# Patient Record
Sex: FEMALE | Race: BLACK OR AFRICAN AMERICAN | NOT HISPANIC OR LATINO
[De-identification: names, ages, dates, MRNs, and addresses within clinical notes are randomized per-mention and may not be internally consistent; named-entity substitution may affect disease eponyms.]

---

## 2017-06-17 ENCOUNTER — LAB SERVICES (OUTPATIENT)
Dept: OTHER | Age: 62
End: 2017-06-17

## 2017-06-19 ENCOUNTER — CHARTING TRANS (OUTPATIENT)
Dept: OTHER | Age: 62
End: 2017-06-19

## 2017-06-19 ASSESSMENT — PAIN SCALES - GENERAL: PAINLEVEL_OUTOF10: 0

## 2017-06-20 LAB
APPEARANCE: NORMAL
BILIRUBIN: NORMAL
COLOR: YELLOW
GLUCOSE U: NORMAL
KETONES: NORMAL
LEUKOCYTE ESTERASE: NORMAL
NITRITE: NORMAL
OCCULT BLOOD: NORMAL
PH: 6
PROTEIN: NORMAL
URINE SPEC GRAVITY: 1.01
UROBILINOGEN: 0.2

## 2017-06-26 ENCOUNTER — CHARTING TRANS (OUTPATIENT)
Dept: OTHER | Age: 62
End: 2017-06-26

## 2017-06-26 LAB — PAP WITH HIGH RISK HPV: NORMAL

## 2018-11-03 VITALS
OXYGEN SATURATION: 97 % | BODY MASS INDEX: 26.49 KG/M2 | WEIGHT: 159 LBS | TEMPERATURE: 98.1 F | HEART RATE: 82 BPM | RESPIRATION RATE: 20 BRPM | DIASTOLIC BLOOD PRESSURE: 97 MMHG | HEIGHT: 65 IN | SYSTOLIC BLOOD PRESSURE: 155 MMHG

## 2022-07-28 ENCOUNTER — OFFICE VISIT (OUTPATIENT)
Dept: OTOLARYNGOLOGY | Facility: CLINIC | Age: 67
End: 2022-07-28
Payer: MEDICARE

## 2022-07-28 ENCOUNTER — OFFICE VISIT (OUTPATIENT)
Dept: AUDIOLOGY | Facility: CLINIC | Age: 67
End: 2022-07-28
Payer: MEDICARE

## 2022-07-28 DIAGNOSIS — R42 VERTIGO: Primary | ICD-10-CM

## 2022-07-28 DIAGNOSIS — R42 DISEQUILIBRIUM: ICD-10-CM

## 2022-07-28 DIAGNOSIS — R42 DIZZINESS: Primary | ICD-10-CM

## 2022-07-28 PROCEDURE — 92567 TYMPANOMETRY: CPT | Performed by: AUDIOLOGIST

## 2022-07-28 PROCEDURE — 99214 OFFICE O/P EST MOD 30 MIN: CPT | Performed by: SPECIALIST

## 2022-07-28 PROCEDURE — 92557 COMPREHENSIVE HEARING TEST: CPT | Performed by: AUDIOLOGIST

## 2022-07-28 RX ORDER — MECLIZINE HCL 12.5 MG/1
12.5 TABLET ORAL 3 TIMES DAILY PRN
COMMUNITY
Start: 2022-06-06

## 2022-07-28 RX ORDER — ERGOCALCIFEROL 1.25 MG/1
50000 CAPSULE ORAL WEEKLY
COMMUNITY
Start: 2022-06-15

## 2022-07-28 RX ORDER — ENALAPRIL MALEATE AND HYDROCHLOROTHIAZIDE 10; 25 MG/1; MG/1
TABLET ORAL
COMMUNITY
Start: 2022-06-03

## 2022-07-28 RX ORDER — AMITRIPTYLINE HYDROCHLORIDE 50 MG/1
TABLET, FILM COATED ORAL
COMMUNITY
End: 2022-07-28 | Stop reason: ALTCHOICE

## 2022-07-28 RX ORDER — CONJUGATED ESTROGENS 0.62 MG/G
CREAM VAGINAL
COMMUNITY

## 2022-07-28 RX ORDER — PREGABALIN 50 MG/1
CAPSULE ORAL
COMMUNITY
Start: 2022-07-26

## 2022-07-28 RX ORDER — PREGABALIN 100 %
POWDER (GRAM) MISCELLANEOUS AS DIRECTED
COMMUNITY
End: 2022-07-28 | Stop reason: ALTCHOICE

## 2022-07-28 RX ORDER — ROSUVASTATIN CALCIUM 40 MG/1
40 TABLET, COATED ORAL DAILY
COMMUNITY
Start: 2022-04-25 | End: 2022-07-28

## 2022-07-28 NOTE — PATIENT INSTRUCTIONS
You had vertigo starting in June. You also have a disequilibrium. Your audiogram was symmetric. Your MRI done in the [address] [place] [address] system was reviewed and normal.  I ordered a vestibular nystagmogram to look at your inner ear balance system.

## 2022-08-12 ENCOUNTER — TELEPHONE (OUTPATIENT)
Dept: AUDIOLOGY | Facility: CLINIC | Age: 67
End: 2022-08-12

## 2022-08-12 NOTE — TELEPHONE ENCOUNTER
Spoke with pt about hearing test results. Informed her that Dr. Stefanie Valladares will be contacting her regarding the VNG results once she gets them .

## 2022-08-15 ENCOUNTER — TELEPHONE (OUTPATIENT)
Dept: OTOLARYNGOLOGY | Facility: CLINIC | Age: 67
End: 2022-08-15

## 2022-08-15 DIAGNOSIS — R42 DISEQUILIBRIUM: ICD-10-CM

## 2022-08-15 DIAGNOSIS — R42 VERTIGO: Primary | ICD-10-CM

## 2022-08-15 NOTE — TELEPHONE ENCOUNTER
Asked the patient to call you back as I cannot find any results in the computer on her VNG. Perhaps she has had this done at a different facility.

## 2022-08-17 ENCOUNTER — TELEPHONE (OUTPATIENT)
Dept: OTOLARYNGOLOGY | Facility: CLINIC | Age: 67
End: 2022-08-17

## 2022-08-17 NOTE — TELEPHONE ENCOUNTER
Per Dr. Leena Esquivel in alternate TE: I left a message for the patient. I received her VNG report. Although it is within normal limits, there is a 21% weakness in the left ear as well as a 21% left directional preponderance. I think this certainly could make her feel unsteady. She can consider vestibular rehab. I asked her to notify me if this is something she would like to do.

## 2022-08-17 NOTE — TELEPHONE ENCOUNTER
Patient has questions regarding her test results and regards to therapy, please call at [phone],YFNEOO.

## 2022-08-17 NOTE — TELEPHONE ENCOUNTER
I left a message for the patient. I received her VNG report. Although it is within normal limits, there is a 21% weakness in the left ear as well as a 21% left directional preponderance. I think this certainly could make her feel unsteady. She can consider vestibular rehab. I asked her to notify me if this is something she would like to do.

## 2022-08-24 ENCOUNTER — TELEPHONE (OUTPATIENT)
Dept: PHYSICAL THERAPY | Facility: HOSPITAL | Age: 67
End: 2022-08-24

## 2022-08-26 ENCOUNTER — OFFICE VISIT (OUTPATIENT)
Dept: PHYSICAL THERAPY | Facility: HOSPITAL | Age: 67
End: 2022-08-26
Attending: SPECIALIST
Payer: COMMERCIAL

## 2022-08-26 DIAGNOSIS — R42 VERTIGO: ICD-10-CM

## 2022-08-26 DIAGNOSIS — R42 DISEQUILIBRIUM: ICD-10-CM

## 2022-08-26 PROCEDURE — 97161 PT EVAL LOW COMPLEX 20 MIN: CPT

## 2022-08-26 NOTE — PROGRESS NOTES
VESTIBULAR EVALUATION:   Referring Physician: Dr. Garry Barthel  Diagnosis: Vertigo     Date of Service: 8/26/2022   Insurance:  [facility] [employer]      PATIENT SUMMARY   Terell Jacobo is a 78year old female who presents to therapy today with reports of dizziness. History/onset of current condition: Pt. Reports waking up on June 1, 2022 with dizziness and ear fullness. She reports that the symptoms persisted and she went to ED. Cardiac testing and imaging were negative. Pt. Reports that symptoms were daily until this last week, and has had intermittent dizziness. Past 2 days have been symptom free. Pt. Reports being cautious about movements, moving slow. Symptoms with cough/sneeze or loud noise: No  Falls: No  Hx of migraines: No  Hx of vision issue: Macular degeneration  Hx of hearing issues: none- right ear fullness    Dizziness: Current 1/10, Best 0/10, Worst 3/10  Quality: spinning, imbalance, leans left, lightheaded  Frequency/Duration:  Intermittent, duration 10-15 min, daily depending upon activity  Aggravates: Unsure- pt. Reports that she felt more symptomatic on her left side than her right side  Relieves: Medication- taking Meclazine every day- in AM    Current functional limitations include Unable to walk for exercise, difficulty with line dancing (unable to turn, has to sit between dances), difficulty with household chores and cleaning- has to sit frequently due to dizziness. Social history:  Lives with , retired , [facility] with stairs with railing (3rd floor)  Aaron Manning describes prior level of function no limitations- walking and line dancing regularly (3x/week)  Pt goals include find out cause of dizziness, decrease dizziness. Past medical history was reviewed with Aaron Manning.  Significant findings includes shingles on posterior area of head- still with symptoms- itching, pain; carpal tunnel bilateral, right rotator cuff tear, cervical degenerative disc disease, HTN, Hypercholesterolemia. Martita Aparicio presents to physical therapy evaluation with primary c/o dizziness, resolving. The results of the objective tests and measures show no impairment in oculomotor exam, postural control or gait. All testing is within normal limits, no symptoms of dizziness or imbalance reported. PT discussed evaluation findings, pathology, plan of care with pt. At this time, there is no indication for skilled PT- pt. Concurred. Pt. Encouraged to exercise as tolerated, return to regular functional activities. Precautions:  Fall Risk  OBJECTIVE:   Physical Exam:  Posture/Observation: pt. Arrived without assistive device, observed to move carefully   Neuro Screen: Sensation: right toes, right fingers paresthesias, no limitations in light touch. Coordination Testing:   Finger to Nose: WNL   Pronation/supination: WNL   Toe tapping: WNL     Cervical spine ROM: WNL all directions  Adverse neuro signs with ROM: yes no: no     Oculomotor & Vestibular Exam:  Spontaneous Nystagmus: room light: none ;  fixation blocked: none  Smooth Pursuit: Negative  Saccades: Negative  Gaze Evoked Nystagmus:  room light: Negative; fixation blocked: Negative  Head Thrust: Negative  VOR screen:  WNL, no symptoms    VOR Cancellation: Negative   Convergence: Negative  Cover/Uncover:  Negative  Cross Cover:  Negative  Head Shaking Nystagmus: Negative  Dynamic Visual Acuity:  Not Tested    Positional Testing:   Shaheen-Hallpike: Negative, no symptoms bilaterally   Roll Test [facility]): Negative, no symptoms     Postural Control:   Romberg: EO 30 sec, EC 30 sec   Four square step test:  13 sec- no observed instability or reports of dizziness    Functional Mobility:   Gait: pt ambulates on level ground with normal mechanics. Functional Gait Assessment   Item Description Score (0 worst, 3 best)    __3_____1. Gait Level Surface-  Time: ___5.5_______seconds  __3_____2. Change in gait speed  __3_____3.  Gait with horizontal head turns   __3_____4. Gait with vertical head turns  __3_____5. Gait and pivot turn  __3_____6. Step over obstacle  __3_____7. Gait with narrow base of support-  # of steps___10_____  ___1____8. Gait with eyes closed-  Time: ______13____seconds  ___3____9. Ambulating backward  ___3____10. Steps    TOTAL SCORE: ____28___/30    (less than 22/30 = fall risk)    Braiding L/R x 20 ft without any observed instability  F/B gait with turning every 4 steps 40 ft x 2 without any observed instability or reports of dizziness    Today's Treatment and Response:   Pt education was provided on exam findings, treatment diagnosis, treatment plan, expectations, and prognosis. Pt was also provided recommendations for detrimental fear avoidance behaviors, importance of remaining active and possible dizziness after evaluation. Charges: PT Eval Low Complexity    Total Timed Treatment: 45 min     Total Treatment Time: 45 min       Goals:   N/A    Frequency / Duration: N/A- pt. Discharged due to no indication for skilled PT at this time. Education or treatment limitation: None   Rehab Potential: good       Thank you for your referral. Please co-sign or sign and return this letter via fax as soon as possible to [phone]. If you have any questions, please contact me at [phone]. Sincerely,  Electronically signed by therapist: Juju Contreras PT  [de-identified] certification required: Yes  I certify the need for these services furnished under this plan of treatment and while under my care.     X___________________________________________________ Date____________________    Certification From: 2/16/7024  To:11/24/2022

## 2022-09-29 ENCOUNTER — APPOINTMENT (OUTPATIENT)
Dept: PHYSICAL THERAPY | Facility: HOSPITAL | Age: 67
End: 2022-09-29
Attending: SPECIALIST
Payer: MEDICARE

## 2025-03-14 ENCOUNTER — HOSPITAL ENCOUNTER (EMERGENCY)
Facility: HOSPITAL | Age: 70
Discharge: HOME | End: 2025-03-14
Attending: EMERGENCY MEDICINE
Payer: MEDICARE

## 2025-03-14 ENCOUNTER — APPOINTMENT (OUTPATIENT)
Dept: GENERAL RADIOLOGY | Facility: HOSPITAL | Age: 70
End: 2025-03-14
Attending: EMERGENCY MEDICINE
Payer: MEDICARE

## 2025-03-14 VITALS
SYSTOLIC BLOOD PRESSURE: 171 MMHG | RESPIRATION RATE: 18 BRPM | TEMPERATURE: 98 F | OXYGEN SATURATION: 99 % | DIASTOLIC BLOOD PRESSURE: 90 MMHG | HEART RATE: 80 BPM | WEIGHT: 180 LBS

## 2025-03-14 DIAGNOSIS — M17.10 ARTHRITIS OF KNEE: Primary | ICD-10-CM

## 2025-03-14 PROCEDURE — 99283 EMERGENCY DEPT VISIT LOW MDM: CPT

## 2025-03-14 PROCEDURE — 96372 THER/PROPH/DIAG INJ SC/IM: CPT

## 2025-03-14 PROCEDURE — 73560 X-RAY EXAM OF KNEE 1 OR 2: CPT | Performed by: EMERGENCY MEDICINE

## 2025-03-14 PROCEDURE — 99284 EMERGENCY DEPT VISIT MOD MDM: CPT

## 2025-03-14 RX ORDER — FAMCICLOVIR 500 MG/1
TABLET ORAL
COMMUNITY
Start: 2022-05-16

## 2025-03-14 RX ORDER — AMLODIPINE BESYLATE 10 MG/1
TABLET ORAL
COMMUNITY

## 2025-03-14 RX ORDER — ENALAPRIL MALEATE 20 MG/1
TABLET ORAL
COMMUNITY

## 2025-03-14 RX ORDER — KETOROLAC TROMETHAMINE 30 MG/ML
30 INJECTION, SOLUTION INTRAMUSCULAR; INTRAVENOUS ONCE
Status: COMPLETED | OUTPATIENT
Start: 2025-03-14 | End: 2025-03-14

## 2025-03-14 NOTE — ED INITIAL ASSESSMENT (HPI)
Pt presents to ed with c/o left knee pain. Pt states she has been having left knee swelling x 3 weeks and is worse now. Pt reports she cannot put weight on it from time to time. Pt also reports left ankle swelling that started this week. Denies SOB or CP.    Pt ambulating with steady gait in triage.     Diclofenac at 8am with some relief.     States she had a xray done 2/20.

## 2025-03-15 NOTE — ED PROVIDER NOTES
Patient Seen in: [facility] Emergency Department    History     Chief Complaint   Patient presents with    Knee Pain       HPI    69-year-old female presents ER with complaint of left knee pain.  Patient states he been having on and off knee pain for the past few months.  Patient had x-ray done outpatient on 2/20 which showed arthritis but states that the pain is getting worse.  Patient does take diclofenac which helps.    History reviewed.   Past Medical History:    Essential hypertension       History reviewed. History reviewed. No pertinent surgical history.      Medications :  Prescriptions Prior to Admission[1]     No family history on file.    Smoking Status:   Social History     Socioeconomic History    Marital status:    Tobacco Use    Smoking status: Never    Smokeless tobacco: Never   Vaping Use    Vaping status: Never Used   Substance and Sexual Activity    Alcohol use: Never    Drug use: Never       ROS  All pertinent positives for the review of systems are mentioned in the HPI  All other organ systems are reviewed and are negative.    Constitutional and vital signs reviewed.      Social History and Family History elements reviewed from today, pertinent positives to the presenting problem noted.    Physical Exam     ED Triage Vitals [03/14/25 1847]   BP (!) 176/99   Pulse 83   Resp 18   Temp 97.7 °F (36.5 °C)   Temp src Temporal   SpO2 95 %   O2 Device None (Room air)       All measures to prevent infection transmission during my interaction with the patient were taken. The patient was already wearing a droplet mask on my arrival to the room. Personal protective equipment including droplet mask, eye protection, and gloves were worn throughout the duration of the exam.  Handwashing was performed prior to and after the exam.  Stethoscope and any equipment used during my examination was cleaned with super sani-cloth germicidal wipes following the exam.     Physical Exam  Vitals and nursing  note reviewed.   Constitutional:       Appearance: She is well-developed.   HENT:      Head: Normocephalic and atraumatic.      Right Ear: External ear normal.      Left Ear: External ear normal.      Nose: Nose normal.   Eyes:      Conjunctiva/sclera: Conjunctivae normal.      Pupils: Pupils are equal, round, and reactive to light.   Cardiovascular:      Rate and Rhythm: Normal rate and regular rhythm.      Heart sounds: Normal heart sounds.   Pulmonary:      Effort: Pulmonary effort is normal.      Breath sounds: Normal breath sounds.   Abdominal:      General: Bowel sounds are normal.      Palpations: Abdomen is soft.   Musculoskeletal:         General: No swelling or tenderness. Normal range of motion.      Cervical back: Normal range of motion and neck supple.      Comments: Full Range of motion of the left knee,   Skin:     General: Skin is warm and dry.   Neurological:      Mental Status: She is alert and oriented to person, place, and time.      Deep Tendon Reflexes: Reflexes are normal and symmetric.   Psychiatric:         Behavior: Behavior normal.         Thought Content: Thought content normal.         Judgment: Judgment normal.         ED Course      Labs Reviewed - No data to display      Imaging Results Available and Reviewed while in ED: XR KNEE (1 OR 2 VIEWS), LEFT (CPT=73560)    Result Date: 3/14/2025  PROCEDURE: XR KNEE (1 OR 2 VIEWS), LEFT (CPT=73560)  COMPARISON: None.  INDICATIONS: Worsening left knee pain x3 weeks.  TECHNIQUE: 2. views were obtained.   FINDINGS/conclusions:  No acute fracture or dislocation.  Mild tricompartmental knee osteoarthritis.  Patellar and quadriceps tendon attachment enthesophytes.  Small nonspecific suprapatellar knee effusion.     Dictated by (CST): Sarah Mccormack MD on 3/14/2025 at 8:12 PM     Finalized by (CST): Sarah Mccormack MD on 3/14/2025 at 8:13 PM         ED Medications Administered:   Medications   ketorolac (Toradol) 30 MG/ML injection 30 mg (30 mg  Intramuscular Given 3/14/25 1929)         [facility]     Vitals:    03/14/25 1847   BP: (!) 176/99   Pulse: 83   Resp: 18   Temp: 97.7 °F (36.5 °C)   TempSrc: Temporal   SpO2: 95%   Weight: 81.6 kg     *I personally reviewed and interpreted all ED vitals.  I also personally reviewed all labs and imaging if ordered    Pulse Ox: 95%, Room air, Normal     Monitor Interpretation:   normal sinus rhythm    Differential Diagnosis/ Diagnostic Considerations: Arthritis, knee strain, muscle strain, ligament injury.    Medical Record Review: I personally reviewed available prior medical records for any recent pertinent discharge summaries, testing, and procedures and reviewed those reports.    Complicating Factors: The patient already has does not have a problem list on file. to contribute to the complexity of this ED evaluation.    Medical Decision Making  69-year-old female presents ER with complaints of left knee discomfort.  Patient's x-ray shows acute arthritis no fracture or subluxation.  Patient given Toradol in the ER and states her pain had improved.  Patient given follow-up with [facility] outpatient for possibility of MRI if pain does not resolve.  Patient's  bedside made aware of discharge planning disposition.    Problems Addressed:  Arthritis of knee: acute illness or injury    Amount and/or Complexity of Data Reviewed  Independent Historian:      Details: Medical history obtained from the spouse bedside states that she had been complaining of worsening knee pain for the past few weeks but with no trauma.  Radiology: ordered and independent interpretation performed. Decision-making details documented in ED Course.     Details: Left knee x-ray reviewed by myself shows arthritis with no sign of fracture or subluxation.    Risk  Prescription drug management.        Condition upon leaving the department: Stable    Disposition and Plan     Clinical Impression:  1. Arthritis of knee         Disposition:  Discharge    Follow-up:  Marcellus Hancock MD  [address] [address]  [phone]    Schedule an appointment as soon as possible for a visit  If symptoms worsen      Medications Prescribed:  Current Discharge Medication List                 [1] (Not in a hospital admission)